# Patient Record
Sex: FEMALE | Race: OTHER
[De-identification: names, ages, dates, MRNs, and addresses within clinical notes are randomized per-mention and may not be internally consistent; named-entity substitution may affect disease eponyms.]

---

## 2018-01-10 ENCOUNTER — HOSPITAL ENCOUNTER (EMERGENCY)
Dept: HOSPITAL 72 - EMR | Age: 28
Discharge: HOME | End: 2018-01-10
Payer: COMMERCIAL

## 2018-01-10 VITALS
HEIGHT: 64 IN | SYSTOLIC BLOOD PRESSURE: 116 MMHG | BODY MASS INDEX: 22.2 KG/M2 | WEIGHT: 130 LBS | DIASTOLIC BLOOD PRESSURE: 68 MMHG

## 2018-01-10 VITALS — SYSTOLIC BLOOD PRESSURE: 110 MMHG | DIASTOLIC BLOOD PRESSURE: 69 MMHG

## 2018-01-10 DIAGNOSIS — R11.2: Primary | ICD-10-CM

## 2018-01-10 DIAGNOSIS — J06.9: ICD-10-CM

## 2018-01-10 PROCEDURE — 82962 GLUCOSE BLOOD TEST: CPT

## 2018-01-10 PROCEDURE — 81025 URINE PREGNANCY TEST: CPT

## 2018-01-10 PROCEDURE — 99284 EMERGENCY DEPT VISIT MOD MDM: CPT

## 2025-07-28 NOTE — EMERGENCY ROOM REPORT
History of Present Illness


General


Chief Complaint:  Flu Like Symptoms


Source:  EMS





Present Illness


HPI


28 YO female presents to the emergency department complaining of N/V x 3 days 

no measured fever or chills with intermittent mucus-like non- productive cough. 

Denies fevers or chills. She denies pregnancy. Denies urinary urgency, frequency

, dysuria, or hematuria. She denies recent travel she does report that her 

brother had similar symptoms this past weekend. Denies constipation, diarrhea 

or blood in the vomit. Denies abdominal pain or tenderness.  Denies CP, 

Palpitations, LOC, AMS, dizziness, Changes in Vision, Sensation, paresthesias, 

or a sudden severe headache.





Allergies:  


Coded Allergies:  


     No Known Allergies (Unverified , 1/10/18)





Patient History


Past Medical History:  see triage record


Pertinent Family History:  none


Pregnant Now:  No


Immunizations:  UTD


Reviewed Nursing Documentation:  PMH: Agreed, PSxH: Agreed





Nursing Documentation-PMH


Past Medical History:  No Stated History





Review of Systems


All Other Systems:  negative except mentioned in HPI





Physical Exam





Vital Signs








  Date Time  Temp Pulse Resp B/P (MAP) Pulse Ox O2 Delivery O2 Flow Rate FiO2


 


1/10/18 15:32 98.8 94 16 116/68 100 Room Air  








Sp02 EP Interpretation:  reviewed, normal


General Appearance:  no apparent distress, alert, GCS 15, non-toxic


Head:  normocephalic, atraumatic


Eyes:  bilateral eye normal inspection, bilateral eye PERRL


ENT:  hearing grossly normal, normal voice


Neck:  full range of motion


Respiratory:  chest non-tender, lungs clear, normal breath sounds, speaking 

full sentences


Cardiovascular #1:  regular rate, rhythm


Gastrointestinal:  normal bowel sounds, non tender, soft, no guarding, no 

rebound


Rectal:  deferred


Genitourinary:  normal inspection, no CVA tenderness


Musculoskeletal:  back normal, gait/station normal, normal range of motion, non-

tender


Neurologic:  alert, oriented x3, responsive, motor strength/tone normal, 

sensory intact, speech normal, grossly normal


Psychiatric:  judgement/insight normal


Skin:  normal color, no rash, warm/dry, well hydrated


Lymphatic:  no adenopathy





Medical Decision Making


PA Attestation


Dr. tobias is my supervising Physician whom patient management has been 

discussed with.


Diagnostic Impression:  


 Primary Impression:  


 Nausea & vomiting


 Qualified Codes:  R11.2 - Nausea with vomiting, unspecified


 Additional Impression:  


 Upper respiratory infection, viral


ER Course


28 YO female presents to the emergency department complaining of N/V x 3 days 

no measured fever or chills with intermittent mucus-like non- productive cough. 

Denies fevers or chills. She denies pregnancy. Denies urinary urgency, frequency

, dysuria, or hematuria. She denies recent travel she does report that her 

brother had similar symptoms this past weekend. Denies constipation, diarrhea 

or blood in the vomit. Denies abdominal pain or tenderness.  Denies CP, 

Palpitations, LOC, AMS, dizziness, Changes in Vision, Sensation, paresthesias, 

or a sudden severe headache.


 





Ddx considered but are not limited to GE, colitis, acute appy, SBO,   * 

Pregnancy





Vital signs:  pt. is afebrile,   


H&PE are most consistent with  GE most likely viral in etiology, no evidence to 

suggest acute abdomen on physical exam.- Given benign abdominal exam and this 

patient being nontoxic in appearance and in no acute distress I do not suspect 

an acute abdominal pathology that would require emergent work up at this time.





ORDERS: 





-Urine Hcg: Negative








ED INTERVENTIONS: 





-Zofran 4mg


-Gi Cocktail





-The patient is able to tolerate oral fluids. 





-I do not identify an emergent condition at this time. With current presentation

,  pt. is stable for close outpatient follow up and conservative treatment.  D/

w pt. to return promptly to ED with worsening or new symptoms.- Pt. (and or 

responsible party) verbalizes' understanding and agreement with proposed 

treatment plan.proposed treatment plan. 





DISCHARGE: At this time pt. is stable for d/c to home. Will provide printed 

patient care instructions, and any necessary prescriptions. Care plan and 

follow up instructions have been discussed with the patient prior to discharge.





Labs








Test


  1/10/18


16:55


 


Urine HCG, Qualitative Negative 











Last Vital Signs








  Date Time  Temp Pulse Resp B/P (MAP) Pulse Ox O2 Delivery O2 Flow Rate FiO2


 


1/10/18 16:10  94 16   Room Air  


 


1/10/18 15:32 98.8   116/68 100   








Disposition:  HOME, SELF-CARE


Condition:  Stable


Scripts


Ranitidine Hcl* (ZANTAC*) 150 Mg Tablet


150 MG ORAL TWICE A DAY for 7 Days, #14 TAB


   Prov: Guadalupe Dc P.A.         1/10/18 


Acetaminophen* (TYLENOL EXTRA STRENGTH*) 500 Mg Tablet


500 MG ORAL Q6H Y for Mild Pain/Temp > 100.5, #20 TAB 0 Refills


   Prov: Guadalupe Dc         1/10/18 


Loratadine/Pseudoephedrine (CLARITIN-D 12 HOUR TABLET) 1 Each Tab.er.12h


1 TAB ORAL EVERY 12 HOURS for 10 Days, #20 TAB


   Prov: Guadalupe Dc         1/10/18 


Codeine/Promethazine Hcl* (PROMETHAZINE-CODEINE SYRUP*) 118 Ml Syrup


5 ML ORAL Q6H Y for For Cough, #118 ML 0 Refills


   Prov: Guadalupe Dc         1/10/18


Patient Instructions:  Nausea and Vomiting, Adult, Easy-to-Read, Upper 

Respiratory Infection, Adult, Easy-to-Read





Additional Instructions:  


Take medications as directed. 


 ** Follow up with a Primary Care Provider in 3-5 days, even if your symptoms 

have resolved. ** 


--Please review list of primary care clinics, if you do not already have a 

primary care provider





Return sooner to ED if new symptoms occur, or current symptoms become worse. 


Do not drink alcohol, drive, or operate heavy machinery while taking cough 

syrup as this may cause drowsiness. 











- Please note that this Emergency Department Report was dictated using [employer] technology software, occasionally this can lead to 

erroneous entry secondary to interpretation by the dictation equipment.











Guadalupe Dc Elijah 10, 2018 17:42 PROGRESS NOTE    Chriss Casper is a 37 year old here for Follow-up and Depression   The patient presents for evaluation of stress and anxiety.    She has been experiencing work-related stress since May 2025, which has escalated to the point of seeking therapy. This stress has led to constant migraines and two hospital visits. 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 Her work environment is described as uncomfortable, likened to a [employer], contributing to her stress. She has been attending counseling sessions since May 2025 and is considering legal action against her employer. She reports a decrease in income due to her job and feels emotionally drained. She is considering taking leave from work and is seeking medication to manage her stress levels. She has not been diagnosed with any specific condition but has been informed by her therapist that she may have PTSD related to childhood traumas.     Symptoms from stress has caused difficulty sleeping, lack of appetite, often only eating once a day or skipping meals entirely. She has been tearful at work due to her situation. She uses melatonin gummies or spray to aid sleep and often dreams about her work situation. She does not experience panic attacks but struggles to stop thinking about her job when trying to sleep. She has never taken any medication for her condition, apart from ibuprofen and Tylenol.    Occupations: Mental health technician  Diet: Often eats only once a day or skips meals entirely  Sleep: Difficulty sleeping, uses melatonin gummies or spray to aid sleep    Past Medical History:   Diagnosis Date   • Asthma (CMD)    • Eczema          Summary of your Discharge Medications          Accurate as of July 28, 2025  1:00 PM. Always use your most recent med list.            Take these Medications      Details   sertraline 50 MG  tablet  Commonly known as: ZOLOFT   Take 1 tablet by mouth daily.     triamcinolone 0.1 % cream  Commonly known as: ARISTOCORT   Apply 1 Application topically in the morning and 1 Application in the evening.             Review of Systems   Constitutional:  Positive for appetite change.   Respiratory: Negative.     Cardiovascular: Negative.    Gastrointestinal: Negative.    Genitourinary: Negative.    Skin: Negative.    Neurological:  Positive for headaches.   Psychiatric/Behavioral:  Positive for sleep disturbance. Negative for self-injury and suicidal ideas. The patient is nervous/anxious.          SDOH Never Smoker          Objective   Vitals:    07/28/25 1024   BP: 139/84   Pulse: 84   Resp: 16   Temp: 98 °F (36.7 °C)   Weight: 105 kg (231 lb 7.7 oz)   Height: 5' 2\" (1.575 m)   BMI (Calculated): 42.34     Physical Exam  Vitals and nursing note reviewed.   Constitutional:       Appearance: Normal appearance.   Cardiovascular:      Rate and Rhythm: Normal rate and regular rhythm.      Heart sounds: Normal heart sounds.   Pulmonary:      Effort: Pulmonary effort is normal.      Breath sounds: Normal breath sounds.   Skin:     General: Skin is warm and dry.   Neurological:      Mental Status: She is alert and oriented to person, place, and time.   Psychiatric:         Mood and Affect: Mood normal.         Thought Content: Thought content normal.      Comments: +tearful              ASSESSMENT AND PLAN        1. Anxiety/stress reaction  - Reports significant stress related to her job, leading to constant headaches, trouble sleeping, and emotional distress.  - Attending counseling since 05/2025 but continues to experience high stress levels. She will continue her therapy with her therapist.  - Discussed various medication for treatment. Zoloft 50 mg prescribed, starting with half a tablet daily for the first week, then increasing to a full tablet daily. Potential side effects, including headaches, fatigue, decreased  libido, nausea and constipation, were discussed.  - Follow-up virtual visit scheduled for 09/04/2025 to assess response to the medication.    Follow-up: 09/04/2025.  Patient education completed on disease process, etiology, and prognosis.  Return to clinic as clinically indicated.  All questions answered and patient verbalized understanding of the treatment and plan.    1. Anxiety  2. Stress reaction  Other orders  -     sertraline (ZOLOFT) 50 MG tablet; Take 1 tablet by mouth daily.      Return in about 4 weeks (around 8/25/2025).